# Patient Record
Sex: FEMALE | Race: WHITE | NOT HISPANIC OR LATINO | URBAN - METROPOLITAN AREA
[De-identification: names, ages, dates, MRNs, and addresses within clinical notes are randomized per-mention and may not be internally consistent; named-entity substitution may affect disease eponyms.]

---

## 2021-02-17 ENCOUNTER — APPOINTMENT (RX ONLY)
Dept: URBAN - METROPOLITAN AREA CLINIC 33 | Facility: CLINIC | Age: 56
Setting detail: DERMATOLOGY
End: 2021-02-17

## 2021-02-17 VITALS
SYSTOLIC BLOOD PRESSURE: 98 MMHG | WEIGHT: 192 LBS | DIASTOLIC BLOOD PRESSURE: 66 MMHG | HEIGHT: 67 IN | HEART RATE: 71 BPM

## 2021-02-17 DIAGNOSIS — Z23 ENCOUNTER FOR IMMUNIZATION: ICD-10-CM

## 2021-02-17 DIAGNOSIS — L24 IRRITANT CONTACT DERMATITIS: ICD-10-CM

## 2021-02-17 DIAGNOSIS — R03.0 ELEVATED BLOOD-PRESSURE READING, WITHOUT DIAGNOSIS OF HYPERTENSION: ICD-10-CM

## 2021-02-17 PROBLEM — Z11.59 ENCOUNTER FOR SCREENING FOR OTHER VIRAL DISEASES: Status: ACTIVE | Noted: 2021-02-17

## 2021-02-17 PROBLEM — L24.9 IRRITANT CONTACT DERMATITIS, UNSPECIFIED CAUSE: Status: ACTIVE | Noted: 2021-02-17

## 2021-02-17 PROCEDURE — ? COUNSELING

## 2021-02-17 PROCEDURE — ? ADDITIONAL NOTES

## 2021-02-17 PROCEDURE — ? PRESCRIPTION

## 2021-02-17 PROCEDURE — ? PLAN FOR BMI MANAGEMENT

## 2021-02-17 PROCEDURE — 99202 OFFICE O/P NEW SF 15 MIN: CPT

## 2021-02-17 RX ORDER — TRIAMCINOLONE ACETONIDE 1 MG/G
SMALL AMOUNT CREAM TOPICAL TWICE A DAY
Qty: 1 | Refills: 0 | Status: ERX

## 2021-02-17 ASSESSMENT — LOCATION DETAILED DESCRIPTION DERM
LOCATION DETAILED: LEFT CENTRAL TEMPLE
LOCATION DETAILED: LEFT SUPERIOR CRUS OF ANTIHELIX
LOCATION DETAILED: LEFT ANTERIOR EARLOBE
LOCATION DETAILED: RIGHT CENTRAL TEMPLE
LOCATION DETAILED: RIGHT CRUS OF HELIX
LOCATION DETAILED: RIGHT LATERAL TEMPLE
LOCATION DETAILED: LEFT CRUS OF HELIX
LOCATION DETAILED: LEFT INFERIOR HELIX
LOCATION DETAILED: LEFT LATERAL TEMPLE

## 2021-02-17 ASSESSMENT — SEVERITY ASSESSMENT 2020: SEVERITY 2020: MILD

## 2021-02-17 ASSESSMENT — LOCATION SIMPLE DESCRIPTION DERM
LOCATION SIMPLE: RIGHT EAR
LOCATION SIMPLE: LEFT TEMPLE
LOCATION SIMPLE: RIGHT TEMPLE
LOCATION SIMPLE: LEFT EAR

## 2021-02-17 ASSESSMENT — BSA RASH: BSA RASH: 1

## 2021-02-17 ASSESSMENT — LOCATION ZONE DERM
LOCATION ZONE: FACE
LOCATION ZONE: EAR

## 2021-02-17 ASSESSMENT — PAIN INTENSITY VAS: HOW INTENSE IS YOUR PAIN 0 BEING NO PAIN, 10 BEING THE MOST SEVERE PAIN POSSIBLE?: NO PAIN

## 2021-02-17 NOTE — PROCEDURE: COUNSELING
Quality 317: Preventative Care And Screening: Screening For High Blood Pressure And Follow-Up Documented: Pre-hypertensive or hypertensive blood pressure reading documented, and the indicated follow-up is documented
Detail Level: Detailed
Quality 110: Preventive Care And Screening: Influenza Immunization: Influenza Immunization Administered during Influenza season
Topical Steroids Recommendations: Apply twice aday
Moisturizer Recommendations: Moisturize twice a day till skin back to normal. Continue daily to keep the skin barrier soft and flexible. Recommend CeraVe cream.
Detail Level: Simple
Patient Specific Counseling (Will Not Stick From Patient To Patient): The location of the eruption along the hair line and the involvement of the ears suggest seborrheic dermatitis. There is not history of dandruff and the scalp is free of erythema and scale. We will monitor of changes in the scalp. \\n\\nThe patient was instructed to treat twice a day with the medicated cream till clear. Follow with a moisturizer twice a day. When patches clear continue moisturizer on a daily basis. If patches return start treatment again. Most patches should clear in 2 weeks. If not clear in 2 weeks stop treatment and return for exam.

## 2021-02-17 NOTE — HPI: RASH
What Type Of Note Output Would You Prefer (Optional)?: Bullet Format
Is The Patient Presenting As Previously Scheduled?: Yes
How Severe Is Your Rash?: moderate
Is This A New Presentation, Or A Follow-Up?: Rash
Additional History: Uses dove sensitive skin to wash her face. Uses cover girl makeup.

## 2021-11-18 ENCOUNTER — APPOINTMENT (RX ONLY)
Dept: URBAN - METROPOLITAN AREA CLINIC 33 | Facility: CLINIC | Age: 56
Setting detail: DERMATOLOGY
End: 2021-11-18

## 2021-11-18 VITALS — WEIGHT: 211 LBS | HEIGHT: 67 IN

## 2021-11-18 VITALS
DIASTOLIC BLOOD PRESSURE: 90 MMHG | SYSTOLIC BLOOD PRESSURE: 131 MMHG | HEART RATE: 85 BPM | HEIGHT: 67 IN | WEIGHT: 211 LBS

## 2021-11-18 DIAGNOSIS — L21.8 OTHER SEBORRHEIC DERMATITIS: ICD-10-CM | Status: INADEQUATELY CONTROLLED

## 2021-11-18 DIAGNOSIS — D18.0 HEMANGIOMA: ICD-10-CM

## 2021-11-18 DIAGNOSIS — R21 RASH AND OTHER NONSPECIFIC SKIN ERUPTION: ICD-10-CM | Status: INADEQUATELY CONTROLLED

## 2021-11-18 DIAGNOSIS — R03.0 ELEVATED BLOOD-PRESSURE READING, WITHOUT DIAGNOSIS OF HYPERTENSION: ICD-10-CM

## 2021-11-18 DIAGNOSIS — D22 MELANOCYTIC NEVI: ICD-10-CM

## 2021-11-18 DIAGNOSIS — Z23 ENCOUNTER FOR IMMUNIZATION: ICD-10-CM

## 2021-11-18 DIAGNOSIS — L57.3 POIKILODERMA OF CIVATTE: ICD-10-CM

## 2021-11-18 PROBLEM — D22.39 MELANOCYTIC NEVI OF OTHER PARTS OF FACE: Status: ACTIVE | Noted: 2021-11-18

## 2021-11-18 PROBLEM — D18.01 HEMANGIOMA OF SKIN AND SUBCUTANEOUS TISSUE: Status: ACTIVE | Noted: 2021-11-18

## 2021-11-18 PROCEDURE — ? PRESCRIPTION

## 2021-11-18 PROCEDURE — 99214 OFFICE O/P EST MOD 30 MIN: CPT

## 2021-11-18 PROCEDURE — ? COUNSELING

## 2021-11-18 PROCEDURE — ? PLAN FOR BMI MANAGEMENT

## 2021-11-18 RX ORDER — CLOBETASOL PROPIONATE 0.5 MG/G
THIN LAYER CREAM TOPICAL BID
Qty: 30 | Refills: 0 | Status: ERX

## 2021-11-18 ASSESSMENT — LOCATION DETAILED DESCRIPTION DERM
LOCATION DETAILED: RIGHT CENTRAL PARIETAL SCALP
LOCATION DETAILED: RIGHT POSTAURICULAR SKIN
LOCATION DETAILED: RIGHT INFERIOR LATERAL NECK
LOCATION DETAILED: LEFT SCAPHA
LOCATION DETAILED: LEFT INFERIOR CENTRAL MALAR CHEEK
LOCATION DETAILED: RIGHT INFERIOR LATERAL FOREHEAD
LOCATION DETAILED: LEFT CENTRAL LATERAL NECK
LOCATION DETAILED: LEFT CENTRAL PARIETAL SCALP
LOCATION DETAILED: LEFT POSTAURICULAR SKIN

## 2021-11-18 ASSESSMENT — LOCATION ZONE DERM
LOCATION ZONE: SCALP
LOCATION ZONE: NECK
LOCATION ZONE: FACE
LOCATION ZONE: EAR

## 2021-11-18 ASSESSMENT — LOCATION SIMPLE DESCRIPTION DERM
LOCATION SIMPLE: SCALP
LOCATION SIMPLE: LEFT EAR
LOCATION SIMPLE: NECK
LOCATION SIMPLE: RIGHT FOREHEAD
LOCATION SIMPLE: LEFT CHEEK
LOCATION SIMPLE: POSTERIOR SCALP

## 2021-11-18 ASSESSMENT — SEVERITY ASSESSMENT
HOW SEVERE IS THIS PATIENT'S CONDITION?: MILD
SEVERITY: MILD

## 2021-11-18 ASSESSMENT — BSA RASH: BSA RASH: 1

## 2021-11-18 NOTE — PROCEDURE: COUNSELING
Quality 110: Preventive Care And Screening: Influenza Immunization: Influenza Immunization not Administered because Patient Refused.
Detail Level: Detailed
Quality 317: Preventative Care And Screening: Screening For High Blood Pressure And Follow-Up Documented: Pre-hypertensive or hypertensive blood pressure reading documented, and the indicated follow-up is documented
Moisturizer Recommendations: Vanicare Light Lotion\\nCeraVe PM Lotion
Cleanser Recommendations: Ollie - ZNP-Bar Soap\\nNeutrogena - Salicylic Body Wash
Detail Level: Zone
Shampoo Recommendations: Shampoos that contain:\\nPyrithione zinc - Head and Shoulders, Happy Cappy for Children\\nSalicylic Acid - Neutrogena T-Sal, Denorex\\nSelenium Sulfide - Selsun Blue, Head and Shoulders \"Clinical Strength\"\\nKetoconazole 1%- Nizoral
Patient Specific Counseling (Will Not Stick From Patient To Patient): Patient is having problems with erythematous scaly patches on the scalp, on the left ear.  She has used off and on Triamcionlone from 15 gm tube prescribed first part of the year and this is her first return.  Patient shares that she uses Aussie shampoo on the scalp and various products for styling - a spray and root .  She notes it can be pruritic.  \\n\\nSuspect that she has seborrheic dermatitis in these regions . At this time recommend that she treat according to plan- provided rotating shampoo's every 3 weeks.  Sample of Z-Bar by Ollie also provided be consistent and daily - also recommend bringing it down to the neck regions when washing 1 x daily.  If things are flaring call the clinic earlier.  \\n\\nPatient does note that in the past she has had eczema diagnosis on the hands and face years ago.  \\n\\n
Detail Level: Simple
Patient Specific Counseling (Will Not Stick From Patient To Patient): She has been scratching on the neck - uncertain the initial trigger however she has developed LSC in one of the regions on the neck (left lower anterior)- the others are red and scaly.  Reviewed in detail and suggest that we treat as directed and then re-evaluate.\\n\\nTREATMENT:\\n1.  Recommend using the Medicated Shampoo for her scalp - 1 x daily around the neck as well- rinse well.\\n2  Apply the topical steroid to these patches only 2 x daily follow with Cerave cream x 2 weeks or less if smooth.\\n3. Stop steroid in 2 weeks continue taking measures not to scratch and moisturizing 2 x daily.  \\\nSuggestion to help with itch:  \\n1.  Wet wash cloths- put in the freezer- to become frozen and then applied to an area of itch and allowed to thaw.   A bag of frozen peas covered with a dishcloth for 10-15 minutes will also do much the same thing - this will stimulate the nerves without damaging the skin.  \\n2.  May use CeraVe ITCH cream found over the counter  - for itch relief - these DOES NOT REPLACE moisturizing with the plain cream.  \\n3.  Scratching the skin does not repair the skin - it feeds the problem of itch - as well as showers and baths that are long and hot. (these release more histamine - hot showers/scratching) . Take measures to not scratch and keep bathing water temperature as tepid. \\n\\nApplication challenges:\\n1.  If having difficulty  reaching an area - options:\\n     *Small foam paint roller\\n      *Back/flat side of back brush covered with saran or a small plastic bag\\n      *Long strip of vinyl from fabric store cast offs (smear and rub on back)\\n      *Long handled spatula\\n\\nSUGGESTED MOISTURIZERS - these are all in jars!\\n1.  Cetaphil Cream - also can be found at [employer] and [employer] online as well as any pharmacy type store\\n2.  CeraVe Cream - can be found in any pharmacy store, ordered online or in [facility] office\\n3   Vanicare Cream - can be found in any pharmacy store, ordered online - the ONLY one with a pump \\n\\n\\n\\n\\n\\n
Sunscreen Recommendations: SPF 30+ reapply every 2hours when out
Sunscreen Recommendations: IGR19=80+ reapply every 2 hours when out

## 2021-11-18 NOTE — PROCEDURE: MIPS QUALITY
Quality 400a: One-Time Screening For Hepatitis C Virus (Hcv) And Treatment Initiation: Patient received one-time screening for HCV infection
Detail Level: Detailed
Additional Notes: Negative on Review:\\n\\nA Baby [place] (born between [id number]\\nGetting tattoos, body art or body piercing with unsterilized tool\\nGetting blood transfusions or having received blood products before 1992       \\nBeing a healthcare provider that had an accidental needle stick\\nUsing unsterile needles or straws with recreational drugs\\nGetting organ transplants before 1992     \\nBeing a [place] era  \\nHaving long-term dialysis for kidney disease\\nBeing born to a mother with Hep C\\nHaving HIV

## 2022-01-05 ENCOUNTER — APPOINTMENT (RX ONLY)
Dept: URBAN - METROPOLITAN AREA CLINIC 33 | Facility: CLINIC | Age: 57
Setting detail: DERMATOLOGY
End: 2022-01-05

## 2022-01-05 VITALS — SYSTOLIC BLOOD PRESSURE: 130 MMHG | DIASTOLIC BLOOD PRESSURE: 82 MMHG | HEART RATE: 84 BPM

## 2022-01-05 DIAGNOSIS — D22 MELANOCYTIC NEVI: ICD-10-CM

## 2022-01-05 DIAGNOSIS — D18.0 HEMANGIOMA: ICD-10-CM

## 2022-01-05 DIAGNOSIS — L20.89 OTHER ATOPIC DERMATITIS: ICD-10-CM | Status: INADEQUATELY CONTROLLED

## 2022-01-05 DIAGNOSIS — Z23 ENCOUNTER FOR IMMUNIZATION: ICD-10-CM

## 2022-01-05 DIAGNOSIS — L57.3 POIKILODERMA OF CIVATTE: ICD-10-CM

## 2022-01-05 DIAGNOSIS — L21.8 OTHER SEBORRHEIC DERMATITIS: ICD-10-CM | Status: IMPROVED

## 2022-01-05 PROBLEM — D18.01 HEMANGIOMA OF SKIN AND SUBCUTANEOUS TISSUE: Status: ACTIVE | Noted: 2022-01-05

## 2022-01-05 PROBLEM — D22.39 MELANOCYTIC NEVI OF OTHER PARTS OF FACE: Status: ACTIVE | Noted: 2022-01-05

## 2022-01-05 PROBLEM — L20.84 INTRINSIC (ALLERGIC) ECZEMA: Status: ACTIVE | Noted: 2022-01-05

## 2022-01-05 PROCEDURE — ? PRESCRIPTION SAMPLES PROVIDED

## 2022-01-05 PROCEDURE — ? COUNSELING

## 2022-01-05 PROCEDURE — 99214 OFFICE O/P EST MOD 30 MIN: CPT

## 2022-01-05 PROCEDURE — ? PRESCRIPTION

## 2022-01-05 RX ORDER — CLOBETASOL PROPIONATE 0.5 MG/ML
SMALL AMOUNT TO PATCHES ON SCALP SOLUTION TOPICAL BID
Qty: 50 | Refills: 0 | Status: ERX

## 2022-01-05 RX ORDER — KETOCONAZOLE 20 MG/ML
SMALL AMOUNT TO SCALP SHAMPOO, SUSPENSION TOPICAL
Qty: 120 | Refills: 1 | Status: ERX | COMMUNITY
Start: 2022-01-05

## 2022-01-05 RX ADMIN — KETOCONAZOLE SMALL AMOUNT TO SCALP: 20 SHAMPOO, SUSPENSION TOPICAL at 00:00

## 2022-01-05 ASSESSMENT — LOCATION DETAILED DESCRIPTION DERM
LOCATION DETAILED: RIGHT INFERIOR PARIETAL SCALP
LOCATION DETAILED: RIGHT INFERIOR TEMPLE
LOCATION DETAILED: RIGHT INFERIOR LATERAL FOREHEAD
LOCATION DETAILED: LEFT INFERIOR CENTRAL MALAR CHEEK
LOCATION DETAILED: LEFT ANTIHELIX
LOCATION DETAILED: RIGHT INFERIOR LATERAL NECK
LOCATION DETAILED: LEFT INFERIOR TEMPLE
LOCATION DETAILED: LEFT CENTRAL LATERAL NECK
LOCATION DETAILED: LEFT INFERIOR PARIETAL SCALP

## 2022-01-05 ASSESSMENT — LOCATION ZONE DERM
LOCATION ZONE: FACE
LOCATION ZONE: EAR
LOCATION ZONE: SCALP
LOCATION ZONE: NECK

## 2022-01-05 ASSESSMENT — LOCATION SIMPLE DESCRIPTION DERM
LOCATION SIMPLE: LEFT EAR
LOCATION SIMPLE: RIGHT TEMPLE
LOCATION SIMPLE: RIGHT FOREHEAD
LOCATION SIMPLE: LEFT CHEEK
LOCATION SIMPLE: NECK
LOCATION SIMPLE: SCALP
LOCATION SIMPLE: LEFT TEMPLE

## 2022-01-05 ASSESSMENT — SEVERITY ASSESSMENT: HOW SEVERE IS THIS PATIENT'S CONDITION?: MILD

## 2022-01-05 NOTE — PROCEDURE: COUNSELING
Quality 110: Preventive Care And Screening: Influenza Immunization: Influenza Immunization not Administered because Patient Refused.
Detail Level: Detailed
Sunscreen Recommendations: SPF 30+ reapply every 2hours when out
Detail Level: Zone
Detail Level: Simple
Sunscreen Recommendations: SKK64=70+ reapply every 2 hours when out
Patient Specific Counseling (Will Not Stick From Patient To Patient): She has improved greatly - however many stressors with recent dx of cancer/throat in her - he starts chemo and radiation tomorrow.  She cleared on the neck- scalp and face some flaring and around the ears- discussed options.  She is rotating her shampoo's.\\n\\nChanges in plan on handout:\\n1.  Substitute Ketoconazole shampoo to her routine of rotating shampoo's 2 x week\\n2.  She may use Clobetasol solution to patches on the scalp 2 x daily no longer than 2 weeks- stopping earlier if symptoms are clear .She understands that this doesn't treat the yeast.  When symptoms clear  STOP Clobetasol and only use PRN.  \\n3.  Call if any concerns and return earlier if needed.
Cleanser Recommendations: Ollie - ZNP-Bar Soap\\nNeutrogena - Salicylic Body Wash
Shampoo Recommendations: Shampoos that contain:\\nPyrithione zinc - Head and Shoulders, Happy Cappy for Children\\nSalicylic Acid - Neutrogena T-Sal, Denorex\\nSelenium Sulfide - Selsun Blue, Head and Shoulders \"Clinical Strength\"\\nKetoconazole 1%- Nizoral
Moisturizer Recommendations: Vanicare Light Lotion\\nCeraVe PM Lotion
Patient Specific Counseling (Will Not Stick From Patient To Patient): Patient has a history of eczema- this around the lateral canthal folds of the eyes look more like an eczematous process than seb derm.  At this time we are going to try Eucrisa samples to this region and then re-evaluate.  She is not to use Clobetasol on the face.\\n\\nTREATMENT:\\n1 . Gentle to the face\\n2.   Try samples of Vanicare line\\n3 .  Eucrisa - 2 x daily- follow with Vanicare or Aquaphor.  If stinging, which it can - use 1 x daily in AM follow with Aquaphor or Vanicare then increase to 2 x daily.  If this proves helpful - consideration to Protopic or Elidel can be done.\\n\\nWe discussed Eurcrisa use, and potential risks along with those associated with Protopic and Elidel including black box warning.  \\n\\nShe is to call if questions or concerns.\\n\\nHer neck completely cleared from treatment done at last visit.  She is now moisturizing and avoiding scratching and pruritus is gone.
Moisturizer Recommendations: Ollie samples provided

## 2022-02-02 ENCOUNTER — APPOINTMENT (RX ONLY)
Dept: URBAN - METROPOLITAN AREA CLINIC 33 | Facility: CLINIC | Age: 57
Setting detail: DERMATOLOGY
End: 2022-02-02

## 2022-02-02 VITALS
HEART RATE: 62 BPM | HEIGHT: 67 IN | DIASTOLIC BLOOD PRESSURE: 86 MMHG | SYSTOLIC BLOOD PRESSURE: 132 MMHG | RESPIRATION RATE: 16 BRPM | WEIGHT: 214 LBS

## 2022-02-02 DIAGNOSIS — L20.89 OTHER ATOPIC DERMATITIS: ICD-10-CM | Status: RESOLVED

## 2022-02-02 DIAGNOSIS — R03.0 ELEVATED BLOOD-PRESSURE READING, WITHOUT DIAGNOSIS OF HYPERTENSION: ICD-10-CM

## 2022-02-02 DIAGNOSIS — L21.8 OTHER SEBORRHEIC DERMATITIS: ICD-10-CM | Status: IMPROVED

## 2022-02-02 DIAGNOSIS — Z23 ENCOUNTER FOR IMMUNIZATION: ICD-10-CM

## 2022-02-02 PROBLEM — D23.61 OTHER BENIGN NEOPLASM OF SKIN OF RIGHT UPPER LIMB, INCLUDING SHOULDER: Status: ACTIVE | Noted: 2022-02-02

## 2022-02-02 PROBLEM — L20.84 INTRINSIC (ALLERGIC) ECZEMA: Status: ACTIVE | Noted: 2022-02-02

## 2022-02-02 PROCEDURE — ? COUNSELING

## 2022-02-02 PROCEDURE — 99214 OFFICE O/P EST MOD 30 MIN: CPT

## 2022-02-02 PROCEDURE — ? PRESCRIPTION

## 2022-02-02 PROCEDURE — ? PLAN FOR BMI MANAGEMENT

## 2022-02-02 PROCEDURE — ? OBSERVATION AND MEASURE

## 2022-02-02 RX ORDER — TACROLIMUS 1 MG/G
THIN LAYER OINTMENT TOPICAL BID
Qty: 30 | Refills: 0 | Status: ERX

## 2022-02-02 RX ORDER — DOXYCYCLINE 100 MG/1
TAKE 1 CAPSULE CAPSULE ORAL BID
Qty: 20 | Refills: 0 | Status: ERX

## 2022-02-02 RX ORDER — CLOBETASOL PROPIONATE 0.5 MG/G
THIN LAYER OINTMENT TOPICAL BID
Qty: 15 | Refills: 0 | Status: ERX | COMMUNITY
Start: 2022-02-02

## 2022-02-02 RX ADMIN — CLOBETASOL PROPIONATE THIN LAYER: 0.5 OINTMENT TOPICAL at 00:00

## 2022-02-02 ASSESSMENT — LOCATION ZONE DERM
LOCATION ZONE: EAR
LOCATION ZONE: SCALP
LOCATION ZONE: FACE

## 2022-02-02 ASSESSMENT — LOCATION DETAILED DESCRIPTION DERM
LOCATION DETAILED: RIGHT CENTRAL FRONTAL SCALP
LOCATION DETAILED: LEFT SUPERIOR HELIX
LOCATION DETAILED: LEFT FOREHEAD

## 2022-02-02 ASSESSMENT — LOCATION SIMPLE DESCRIPTION DERM
LOCATION SIMPLE: SCALP
LOCATION SIMPLE: LEFT FOREHEAD
LOCATION SIMPLE: LEFT EAR

## 2022-02-02 NOTE — PROCEDURE: COUNSELING
Quality 317: Preventative Care And Screening: Screening For High Blood Pressure And Follow-Up Documented: Pre-hypertensive or hypertensive blood pressure reading documented, and the indicated follow-up is documented
Detail Level: Detailed
Quality 110: Preventive Care And Screening: Influenza Immunization: Influenza Immunization not Administered because Patient Refused.
Patient Specific Counseling (Will Not Stick From Patient To Patient): This is currently presenting as well managed she is pleased noting it isn't really bothersome at all /She does note some scale at times.   Reviewed managed not cured.  Advised on the following plan - which she has been doing . Recommend note using the steroid unless absolutely necessary.  \\n\\nContinue:\\n1.  Substitute Ketoconazole shampoo to her routine of rotating shampoo's 2 x week\\n2.  She may use Clobetasol solution to patches on the scalp 2 x daily no longer than 2 weeks- stopping earlier if symptoms are clear .She understands that this doesn't treat the yeast.  When symptoms clear  STOP Clobetasol and only use PRN.  \\n3.  Call if any concerns and return earlier if needed.
Cleanser Recommendations: Ollie - ZNP-Bar Soap\\nNeutrogena - Salicylic Body Wash
Detail Level: Simple
Shampoo Recommendations: Shampoos that contain:\\nPyrithione zinc - Head and Shoulders, Happy Cappy for Children\\nSalicylic Acid - Neutrogena T-Sal, Denorex\\nSelenium Sulfide - Selsun Blue, Head and Shoulders \"Clinical Strength\"\\nKetoconazole 1%- Nizoral
Moisturizer Recommendations: Vanicare Light Lotion\\nCeraVe PM Lotion
Patient Specific Counseling (Will Not Stick From Patient To Patient): Patient has cleared on the skin and is much better when treated with the Eucrisa samples.  We discussed that this can be recurrent- and she notes that it has.  Believe that we have better success getting Tacrolimus covered topically than Eucrisa so we will send in script for that =- she can use to u se it the same way as the Eucrisa- when she has activity- and it is not a steroid so we do not have those concerns.  \\n\\nPatient has a history of eczema- this around the lateral canthal folds of the eyes look more like an eczematous process than seb derm.  At this time we are going to try Eucrisa samples to this region and then re-evaluate.  She is not to use Clobetasol on the face.\\n\\nTREATMENT:\\n1 . Gentle to the face\\n2.   Try samples of Vanicare line\\n3 . Tacrolimus 2 x daily- follow with Vanicare or Aquaphor.  If stinging, which it can - use 1 x daily in AM follow with Aquaphor or Vanicare then increase to 2 x daily. Use if and as needed. \\n\\nDiscussed Protopic use including otential risks along with those associated with black box warning.  \\n\\nShe is to call if questions or concerns.\\n\\n
Moisturizer Recommendations: Ollie samples provided
Patient Specific Counseling (Will Not Stick From Patient To Patient): Patient shares that she cut her finger this last week- and she treated it and ignored it and today she presents with it somewhat swollen and tender.   - suspect possibly she may be developing a pyogenic granuloma.  The skin around the area is somewhat sensitive.  We discussed this as it was an \"add on\" at the end of visit and suggest that we try to decrease the inflammation and then return first part of next week- it may require shave and cauterization . There is no discharge and it doesn't have features that appear infected however post-visit (and will have JANESSA Atkinson call her) going to have her treat with oral antibiotic therapy along with use of the topical steroid to see if we can decrease inflammation and then we will re-evaluate and treat if still a concern.  Reassured today do not find anything that appears left in the skin.  She states it was a cut and she doesn't believe anything is present.\\n\\nTREATMENT:\\n1.  Protect the finger avoid self-manipulation\\n2.  Apply 2 x daily the Clobetasol and cover with a bandaide no longer than 2 weeks - but is due to be seen first part of next week\\n3.  Take Doxycycline 100 mg capsule-take 2 x daily x 10 days- avoid taking with calcium, iron or zinc.   Be mindful of potential adverse effects including sun burn, take with food if needed just not dairy, if headaches new or different or other concerns develop call.\\n\\nWill have JANESSA Atkinson also inquire if her update on Tetanus is current- as believe she said this was a metal box.  \\n\\nSee chart note forwarded to JANESSA Atkinson. for more details on the call.

## 2022-02-02 NOTE — HPI: SKIN LESION
Is This A New Presentation, Or A Follow-Up?: Skin Lesion
What Type Of Note Output Would You Prefer (Optional)?: Bullet Format
How Severe Is Your Skin Lesion?: moderate
Has Your Skin Lesion Been Treated?: not been treated
Additional History: At the end of the visit patient mentioned injury to her finger last week- and that it hasn't resolved it is tender, and doesn't seem to be healing . She cut it on a box, believe a metal box, and reports cleaned and treated and not resolving or behaving as expected.  She doesn't believe anything is in the skin.

## 2022-02-02 NOTE — PROCEDURE: OBSERVATION
Detail Level: Simple
Size Of Lesion: 0.6 cm
Morphology Per Location (Optional): Hypopigmented papule, central fissure

## 2022-06-20 ENCOUNTER — APPOINTMENT (RX ONLY)
Dept: URBAN - METROPOLITAN AREA CLINIC 33 | Facility: CLINIC | Age: 57
Setting detail: DERMATOLOGY
End: 2022-06-20

## 2022-06-20 DIAGNOSIS — L21.8 OTHER SEBORRHEIC DERMATITIS: ICD-10-CM

## 2022-06-20 PROCEDURE — ? PRESCRIPTION

## 2022-06-20 RX ORDER — KETOCONAZOLE 20 MG/ML
SMALL AMOUNT TO SCALP SHAMPOO, SUSPENSION TOPICAL
Qty: 120 | Refills: 0 | Status: ERX

## 2022-06-20 RX ORDER — CLOBETASOL PROPIONATE 0.5 MG/ML
SMALL AMOUNT SOLUTION TOPICAL
Qty: 50 | Refills: 0 | Status: ERX

## 2022-08-09 ENCOUNTER — APPOINTMENT (RX ONLY)
Dept: URBAN - METROPOLITAN AREA CLINIC 33 | Facility: CLINIC | Age: 57
Setting detail: DERMATOLOGY
End: 2022-08-09

## 2022-08-09 VITALS
DIASTOLIC BLOOD PRESSURE: 79 MMHG | SYSTOLIC BLOOD PRESSURE: 116 MMHG | HEART RATE: 90 BPM | HEIGHT: 67 IN | WEIGHT: 214 LBS

## 2022-08-09 DIAGNOSIS — L21.8 OTHER SEBORRHEIC DERMATITIS: ICD-10-CM

## 2022-08-09 DIAGNOSIS — L85.3 XEROSIS CUTIS: ICD-10-CM

## 2022-08-09 DIAGNOSIS — L82.1 OTHER SEBORRHEIC KERATOSIS: ICD-10-CM

## 2022-08-09 DIAGNOSIS — L29.89 OTHER PRURITUS: ICD-10-CM

## 2022-08-09 DIAGNOSIS — D22 MELANOCYTIC NEVI: ICD-10-CM

## 2022-08-09 DIAGNOSIS — L29.8 OTHER PRURITUS: ICD-10-CM

## 2022-08-09 PROBLEM — D22.4 MELANOCYTIC NEVI OF SCALP AND NECK: Status: ACTIVE | Noted: 2022-08-09

## 2022-08-09 PROCEDURE — ? PLAN FOR BMI MANAGEMENT

## 2022-08-09 PROCEDURE — ? COUNSELING

## 2022-08-09 PROCEDURE — 99213 OFFICE O/P EST LOW 20 MIN: CPT

## 2022-08-09 ASSESSMENT — LOCATION SIMPLE DESCRIPTION DERM
LOCATION SIMPLE: ANTERIOR SCALP
LOCATION SIMPLE: SCALP
LOCATION SIMPLE: LEFT EAR
LOCATION SIMPLE: RIGHT FOREHEAD
LOCATION SIMPLE: POSTERIOR SCALP

## 2022-08-09 ASSESSMENT — LOCATION DETAILED DESCRIPTION DERM
LOCATION DETAILED: RIGHT SUPERIOR FOREHEAD
LOCATION DETAILED: LEFT SUPERIOR HELIX
LOCATION DETAILED: POSTERIOR MID-PARIETAL SCALP
LOCATION DETAILED: RIGHT MEDIAL FOREHEAD
LOCATION DETAILED: RIGHT SUPERIOR LATERAL FOREHEAD
LOCATION DETAILED: RIGHT CENTRAL FRONTAL SCALP
LOCATION DETAILED: MID-OCCIPITAL SCALP
LOCATION DETAILED: RIGHT CENTRAL PARIETAL SCALP
LOCATION DETAILED: MID-FRONTAL SCALP
LOCATION DETAILED: LEFT CENTRAL PARIETAL SCALP

## 2022-08-09 ASSESSMENT — LOCATION ZONE DERM
LOCATION ZONE: EAR
LOCATION ZONE: FACE
LOCATION ZONE: SCALP

## 2022-08-09 NOTE — PROCEDURE: COUNSELING
Detail Level: Simple
Moisturizer Recommendations: Vanicare Light Lotion\\nCeraVe PM Lotion
Shampoo Recommendations: Shampoos that contain:\\nPyrithione zinc - Head and Shoulders, Happy Cappy for Children\\nSalicylic Acid - Neutrogena T-Sal, Denorex\\nSelenium Sulfide - Selsun Blue, Head and Shoulders \"Clinical Strength\"\\nKetoconazole 1%- Nizoral
Cleanser Recommendations: [place] - ZNP-Bar Soap\\nNeutrogena - Salicylic Body Wash
Patient Specific Counseling (Will Not Stick From Patient To Patient): Scalp is clear today- she is washing with her Selsun Blue and using Ketoconazole 2 x week. She notes that she started about 3 weeks ago- use of Clobetasol Solution every other day.  She states her scalp is sensitive, tender at times and pruritic.  On discussion she tries to \"not\" scratch her scalp- and also \"tries\" to not use nails when washing daily.  She notes tenderness at times on the scalp.  She has traveled considerable recently and didn't have problems until returning to [place]. She also notes possibly more stressors on return.\\n\\nWe talked that there is nothing visible on exam .Discussed reasons for tenderness on the scalp. Would recommend today the following:\\n1 . Stop topical steroid - nothing is visible where that is helpful.\\n2.  Continue with shampoo's r ecommend possibly her Denorex since that has tingling effect.  2 x week Ketoconazole.\\n3 . Consideration of a mint conditioner or a drop of peppermint (mild =) oil intot her conditioner to help with sensation on the scalp\\n4 .Do not use nails on the scalp- be gentle with pads of fingers and avoid scratching.  At this time recommend no Clobetasol.  \\n\\nRecommend return as scheduled and we can revisit her story and see how things are doing.\\n\\nThere is slight scale on inner left helical rim- suggest shampooing these areas (which she notes she hasn't been) \"trying to avoid getting it there\" and recommend Vaseline or Aquaphor to the ear every night.
Moisturizer Recommendations: Recommend primarily creams in jars:\\n1 . CeraVe Moisturizing Cream\\n2.  Cetaphil Gentle Cleanser\\n3.  Vanicare Cream\\n\\nOintment:\\n1  Vanicare Ointment Lotion\\n2. Eucerin Aquaphor\\n3  Vaseline Petrolatum\\n\\nMinimalist Approach- Shower Lotion\\n1.  Shelly In-Shower Body Lotion\\n2.  Eucerin
Cleanser Recommendations: Over the counter recommendations:\\n1.  Cetaphil Gentle Cleanser\\n2.  Vanicare Body Wash\\n3.  CeraVe Hydrating Body wash\\n4.  Eucerin Skin Calming Dry Skin Body wash\\n5.  Dove Dry Skin Relief Body Wash
Sunscreen Recommendations: GHV52=55+ reapply every 2 hours when out
Pramoxine 1% Recommendations: CeraVe Itch cream may prove helpful
Moisturizer Recommendations: Vanicare line\\nCeraVe
Cleanser Recommendations: Cetaphil Gentle Cleanser
Patient Specific Counseling (Will Not Stick From Patient To Patient): Discussed importance of not scratching.  And follow plan as noted in Seb derm information.
Antihistamine Recommendations: Don't typically recommend however she is already taking Kids Benadryl periodically

## 2022-09-07 ENCOUNTER — APPOINTMENT (RX ONLY)
Dept: URBAN - METROPOLITAN AREA CLINIC 33 | Facility: CLINIC | Age: 57
Setting detail: DERMATOLOGY
End: 2022-09-07

## 2022-09-07 VITALS
SYSTOLIC BLOOD PRESSURE: 110 MMHG | DIASTOLIC BLOOD PRESSURE: 78 MMHG | WEIGHT: 204 LBS | HEART RATE: 82 BPM | HEIGHT: 67 IN

## 2022-09-07 DIAGNOSIS — L21.8 OTHER SEBORRHEIC DERMATITIS: ICD-10-CM | Status: RESOLVING

## 2022-09-07 PROCEDURE — ? PLAN FOR BMI MANAGEMENT

## 2022-09-07 PROCEDURE — 99213 OFFICE O/P EST LOW 20 MIN: CPT

## 2022-09-07 PROCEDURE — ? COUNSELING

## 2022-09-07 ASSESSMENT — LOCATION ZONE DERM: LOCATION ZONE: SCALP

## 2022-09-07 ASSESSMENT — LOCATION DETAILED DESCRIPTION DERM
LOCATION DETAILED: LEFT CENTRAL PARIETAL SCALP
LOCATION DETAILED: RIGHT CENTRAL PARIETAL SCALP
LOCATION DETAILED: RIGHT SUPERIOR FRONTAL SCALP

## 2022-09-07 ASSESSMENT — LOCATION SIMPLE DESCRIPTION DERM: LOCATION SIMPLE: SCALP

## 2022-09-07 ASSESSMENT — SEVERITY ASSESSMENT: HOW SEVERE IS THIS PATIENT'S CONDITION?: ALMOST CLEAR

## 2022-09-07 NOTE — PROCEDURE: COUNSELING
Patient Specific Counseling (Will Not Stick From Patient To Patient): this is better and improved .She doesn't like the 'scale\" that she see's.  The patches are small, thin and soft scale - no plaques and no erythema.  Advised options as she rates pruritis as much better at a 5.\\n\\nWe reviewed use of topical steroid=- which she hasn't been using in form of her solution again and/or consideration of shampoo.  After much discussion decided on following plan:\\n1.  Continue rotating shampoo's as before- and using minted conditioner/shampoo.  \\n2.  Insert ketoconazole shampoo 2 x week\\n3.  Use Clobetasol solution that she has 2 x daily to the affected areas no longer than 2 weeks stopping earlier if clear.\\n4.  If this flares or if steroid refill is needed she may need to be seen prior to her 3 month follow up.\\n\\n\\nCall if questions or return if flaring.
Detail Level: Simple
Cleanser Recommendations: Ollie - ZNP-Bar Soap\\nNeutrogena - Salicylic Body Wash
Moisturizer Recommendations: Vanicare Light Lotion\\nCeraVe PM Lotion
Shampoo Recommendations: Shampoos that contain:\\nPyrithione zinc - Head and Shoulders, Happy Cappy for Children\\nSalicylic Acid - Neutrogena T-Sal, Denorex\\nSelenium Sulfide - Selsun Blue, Head and Shoulders \"Clinical Strength\"\\nKetoconazole 1%- Nizoral

## 2022-09-07 NOTE — HPI: RASH (SEBORRHEIC DERMATITIS)
How Severe Is It?: moderate
Is This A New Presentation, Or A Follow-Up?: Follow Up Seborrheic Dermatitis
Additional History: Every 2 weeks shampoos are being alternating